# Patient Record
Sex: MALE | Race: WHITE | NOT HISPANIC OR LATINO | Employment: FULL TIME | URBAN - NONMETROPOLITAN AREA
[De-identification: names, ages, dates, MRNs, and addresses within clinical notes are randomized per-mention and may not be internally consistent; named-entity substitution may affect disease eponyms.]

---
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
## 2021-01-19 ENCOUNTER — HOSPITAL ENCOUNTER (EMERGENCY)
Facility: HOSPITAL | Age: 21
Discharge: LEFT BEFORE DISCHARGE | End: 2021-01-20
Admitting: FAMILY MEDICINE

## 2021-01-19 VITALS
OXYGEN SATURATION: 99 % | WEIGHT: 110 LBS | SYSTOLIC BLOOD PRESSURE: 129 MMHG | HEART RATE: 83 BPM | RESPIRATION RATE: 16 BRPM | TEMPERATURE: 98.2 F | DIASTOLIC BLOOD PRESSURE: 78 MMHG | HEIGHT: 67 IN | BODY MASS INDEX: 17.27 KG/M2

## 2021-01-19 PROCEDURE — 99211 OFF/OP EST MAY X REQ PHY/QHP: CPT

## 2021-01-19 PROCEDURE — 93010 ELECTROCARDIOGRAM REPORT: CPT | Performed by: INTERNAL MEDICINE

## 2021-01-19 PROCEDURE — 93005 ELECTROCARDIOGRAM TRACING: CPT | Performed by: FAMILY MEDICINE

## 2021-01-20 NOTE — ED NOTES
Pt called second time for vital sign reassessment. No answer. Pt not found in ER lobby, radiology waiting area, nor noted to be outside ER doors.      Cheyenne Hampton, RN  01/20/21 0127

## 2021-01-20 NOTE — ED NOTES
Pt called for vital reassessment at this time. No answer      Cheyenne Hampton RN  01/20/21 0022

## 2021-01-21 LAB
QT INTERVAL: 414 MS
QTC INTERVAL: 474 MS

## 2021-10-28 ENCOUNTER — HOSPITAL ENCOUNTER (EMERGENCY)
Facility: HOSPITAL | Age: 21
Discharge: HOME | End: 2021-10-28
Attending: STUDENT IN AN ORGANIZED HEALTH CARE EDUCATION/TRAINING PROGRAM | Admitting: STUDENT IN AN ORGANIZED HEALTH CARE EDUCATION/TRAINING PROGRAM

## 2021-10-28 ENCOUNTER — APPOINTMENT (OUTPATIENT)
Dept: GENERAL RADIOLOGY | Facility: HOSPITAL | Age: 21
End: 2021-10-28

## 2021-10-28 VITALS
BODY MASS INDEX: 15.91 KG/M2 | HEIGHT: 68 IN | DIASTOLIC BLOOD PRESSURE: 74 MMHG | HEART RATE: 108 BPM | SYSTOLIC BLOOD PRESSURE: 128 MMHG | OXYGEN SATURATION: 97 % | WEIGHT: 105 LBS | TEMPERATURE: 97.3 F | RESPIRATION RATE: 18 BRPM

## 2021-10-28 DIAGNOSIS — M25.572 ACUTE LEFT ANKLE PAIN: ICD-10-CM

## 2021-10-28 DIAGNOSIS — M79.672 LEFT FOOT PAIN: Primary | ICD-10-CM

## 2021-10-28 PROCEDURE — 99283 EMERGENCY DEPT VISIT LOW MDM: CPT

## 2021-10-28 PROCEDURE — 73610 X-RAY EXAM OF ANKLE: CPT

## 2021-10-28 PROCEDURE — 73630 X-RAY EXAM OF FOOT: CPT

## 2021-10-28 RX ORDER — IBUPROFEN 800 MG/1
800 TABLET ORAL EVERY 6 HOURS PRN
Qty: 30 TABLET | Refills: 0 | Status: SHIPPED | OUTPATIENT
Start: 2021-10-28

## 2024-07-23 ENCOUNTER — TRANSCRIBE ORDERS (OUTPATIENT)
Dept: ADMINISTRATIVE | Facility: HOSPITAL | Age: 24
End: 2024-07-23
Payer: COMMERCIAL

## 2024-07-23 DIAGNOSIS — I45.6 WOLFF-PARKINSON-WHITE SYNDROME: Primary | ICD-10-CM

## 2024-08-07 ENCOUNTER — OFFICE VISIT (OUTPATIENT)
Dept: CARDIOLOGY | Facility: CLINIC | Age: 24
End: 2024-08-07
Payer: COMMERCIAL

## 2024-08-07 VITALS
DIASTOLIC BLOOD PRESSURE: 66 MMHG | HEART RATE: 79 BPM | OXYGEN SATURATION: 98 % | HEIGHT: 68 IN | WEIGHT: 112.8 LBS | SYSTOLIC BLOOD PRESSURE: 118 MMHG | BODY MASS INDEX: 17.1 KG/M2

## 2024-08-07 DIAGNOSIS — I45.6 WPW (WOLFF-PARKINSON-WHITE SYNDROME): Primary | ICD-10-CM

## 2024-08-07 PROCEDURE — 1159F MED LIST DOCD IN RCRD: CPT | Performed by: NURSE PRACTITIONER

## 2024-08-07 PROCEDURE — 93000 ELECTROCARDIOGRAM COMPLETE: CPT | Performed by: NURSE PRACTITIONER

## 2024-08-07 PROCEDURE — 99203 OFFICE O/P NEW LOW 30 MIN: CPT | Performed by: NURSE PRACTITIONER

## 2024-08-07 PROCEDURE — 1160F RVW MEDS BY RX/DR IN RCRD: CPT | Performed by: NURSE PRACTITIONER

## 2024-08-07 RX ORDER — ERGOCALCIFEROL 1.25 MG/1
50000 CAPSULE ORAL WEEKLY
COMMUNITY
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
## 2024-08-07 NOTE — LETTER
August 8, 2024     ZAKIYA Costa  [address]  [place] [address]    Patient: Jonathan Granado   YOB: 2000   Date of Visit: 8/7/2024       Dear ZAKIYA Costa,    Jonathan Granado was in my office today. Below are the relevant portions of my assessment and plan of care.           If you have questions, please do not hesitate to call me. I look forward to following Jonathan along with you.         Sincerely,        ZAKIYA Castillo        CC: No Recipients

## 2024-08-08 PROBLEM — I45.6 WPW (WOLFF-PARKINSON-WHITE SYNDROME): Status: ACTIVE | Noted: 2024-08-08

## 2024-08-09 ENCOUNTER — HOSPITAL ENCOUNTER (OUTPATIENT)
Facility: HOSPITAL | Age: 24
Discharge: HOME | End: 2024-08-09
Payer: COMMERCIAL

## 2024-08-09 DIAGNOSIS — I45.6 WOLFF-PARKINSON-WHITE SYNDROME: ICD-10-CM

## 2024-08-09 LAB
BH CV ECHO MEAS - ACS: 2.11 CM
BH CV ECHO MEAS - AO MAX PG: 4.2 MMHG
BH CV ECHO MEAS - AO MEAN PG: 2.3 MMHG
BH CV ECHO MEAS - AO ROOT DIAM: 2.7 CM
BH CV ECHO MEAS - AO V2 MAX: 103 CM/SEC
BH CV ECHO MEAS - AO V2 VTI: 18.9 CM
BH CV ECHO MEAS - EDV(MOD-SP4): 67.6 ML
BH CV ECHO MEAS - EF(MOD-SP4): 56.1 %
BH CV ECHO MEAS - EPSS: 0.2 CM
BH CV ECHO MEAS - ESV(MOD-SP4): 29.7 ML
BH CV ECHO MEAS - IVS/LVPW: 1.46 CM
BH CV ECHO MEAS - IVSD: 1.04 CM
BH CV ECHO MEAS - LA DIMENSION: 2.8 CM
BH CV ECHO MEAS - LV DIASTOLIC VOL/BSA (35-75): 42.3 CM2
BH CV ECHO MEAS - LV MAX PG: 2.9 MMHG
BH CV ECHO MEAS - LV MEAN PG: 1.8 MMHG
BH CV ECHO MEAS - LV SYSTOLIC VOL/BSA (12-30): 18.6 CM2
BH CV ECHO MEAS - LV V1 MAX: 85 CM/SEC
BH CV ECHO MEAS - LV V1 VTI: 16.8 CM
BH CV ECHO MEAS - LVIDD: 3.4 CM
BH CV ECHO MEAS - LVIDS: 2.49 CM
BH CV ECHO MEAS - LVOT DIAM: 2.05 CM
BH CV ECHO MEAS - LVPWD: 0.71 CM
BH CV ECHO MEAS - PA ACC TIME: 0.12 SEC
BH CV ECHO MEAS - PA V2 MAX: 104 CM/SEC
BH CV ECHO MEAS - SV(MOD-SP4): 37.9 ML
BH CV ECHO MEAS - SVI(MOD-SP4): 23.7 ML/M2
BH CV ECHO MEAS - TAPSE (>1.6): 2.23 CM
BH CV ECHO MEAS - TR MAX PG: 7.6 MMHG
BH CV ECHO MEAS - TR MAX VEL: 109.3 CM/SEC

## 2024-08-09 PROCEDURE — 93306 TTE W/DOPPLER COMPLETE: CPT

## 2024-08-20 ENCOUNTER — TELEPHONE (OUTPATIENT)
Dept: CARDIOLOGY | Facility: CLINIC | Age: 24
End: 2024-08-20
Payer: COMMERCIAL

## 2024-08-20 NOTE — TELEPHONE ENCOUNTER
PTS WIFE CALLED, WANTING TO KNOW IF WE COULD READ THE ECHO HE HAD DONE ON 08/09.    WE DIDN'T ORDER THE ECHO, BUT THE PT HAS SINCE SWITCHED TO OUR CARE.

## 2024-08-21 ENCOUNTER — TELEPHONE (OUTPATIENT)
Dept: CARDIOLOGY | Facility: CLINIC | Age: 24
End: 2024-08-21
Payer: COMMERCIAL

## 2024-08-21 NOTE — TELEPHONE ENCOUNTER
ADV PT OF [facility] MESSAGE:  The echocardiogram has been read and is normal. Will plan to discuss further at follow up christentSharon Gee

## 2024-10-28 ENCOUNTER — TELEPHONE (OUTPATIENT)
Dept: CARDIOLOGY | Facility: CLINIC | Age: 24
End: 2024-10-28

## 2024-10-28 NOTE — TELEPHONE ENCOUNTER
Caller: PUNEET    Relationship: Provider    Best call back number: [phone]    What is the best time to reach you: ANYTIME    Who are you requesting to speak with (clinical staff, provider,  specific staff member): PROVIDER    Do you know the name of the person who called: NA    What was the call regarding: [facility] NEEDING US TO SEND OVER HOLTER RESULTS FROM 8.7.24-8.30.24. THEY HAVE THE REPORT BUT NOT THE STRIPS. PLEASE SEND OVER STRIPS WHEN POSSIBLE.  FAX: [phone]    Is it okay if the provider responds through MyChart: NO